# Patient Record
Sex: MALE | URBAN - METROPOLITAN AREA
[De-identification: names, ages, dates, MRNs, and addresses within clinical notes are randomized per-mention and may not be internally consistent; named-entity substitution may affect disease eponyms.]

---

## 2023-12-22 ENCOUNTER — TELEPHONE (OUTPATIENT)
Dept: FAMILY MEDICINE CLINIC | Facility: CLINIC | Age: 58
End: 2023-12-22

## 2023-12-22 NOTE — TELEPHONE ENCOUNTER
Pt's wife called to set up NP apt with Dr. Pereira.   Future Appointments   Date Time Provider Department Center   2/17/2024  8:00 AM Madison Pereira MD [facility]     Per pt's wife, pt's old PCP retired. Pt's wife states that he only has 2 more weeks left of his BP medication and has no way to get meds refilled. Pt's wife advised that Dr. Pereira will not be able to prescribe anything until pt is seen and verbalized understanding. Pt's wife wondering what will happen if he stops taking his bp medication and if there is something else he could do in meantime. Please advise

## 2023-12-27 NOTE — TELEPHONE ENCOUNTER
Please call pt.  Needs to be seen sooner so he does not run out of medication.  Please schedule with Delphine.